# Patient Record
Sex: FEMALE | Race: WHITE | Employment: FULL TIME | URBAN - METROPOLITAN AREA
[De-identification: names, ages, dates, MRNs, and addresses within clinical notes are randomized per-mention and may not be internally consistent; named-entity substitution may affect disease eponyms.]

---

## 2017-05-26 ENCOUNTER — OFFICE VISIT (OUTPATIENT)
Dept: INTERNAL MEDICINE | Facility: CLINIC | Age: 47
End: 2017-05-26

## 2017-05-26 VITALS — HEIGHT: 69 IN | WEIGHT: 170.8 LBS | RESPIRATION RATE: 16 BRPM | BODY MASS INDEX: 25.3 KG/M2 | OXYGEN SATURATION: 98 %

## 2017-05-26 DIAGNOSIS — N20.0 NEPHROLITHIASIS: ICD-10-CM

## 2017-05-26 DIAGNOSIS — R19.5 LOOSE STOOLS: Primary | ICD-10-CM

## 2017-05-26 PROCEDURE — 99203 OFFICE O/P NEW LOW 30 MIN: CPT | Performed by: INTERNAL MEDICINE

## 2017-05-26 RX ORDER — TERBINAFINE HYDROCHLORIDE 250 MG/1
TABLET ORAL
Refills: 1 | COMMUNITY
Start: 2017-05-15

## 2017-05-26 RX ORDER — DICLOFENAC SODIUM 75 MG/1
TABLET, DELAYED RELEASE ORAL
Refills: 1 | COMMUNITY
Start: 2017-05-17 | End: 2021-07-06

## 2017-05-31 LAB
ALBUMIN SERPL-MCNC: 4.3 G/DL (ref 3.5–5.2)
ALBUMIN/GLOB SERPL: 1.9 G/DL
ALP SERPL-CCNC: 70 U/L (ref 40–129)
ALT SERPL-CCNC: 13 U/L (ref 5–33)
AST SERPL-CCNC: 20 U/L (ref 5–32)
BASOPHILS # BLD AUTO: 0.04 10*3/MM3 (ref 0–0.2)
BASOPHILS NFR BLD AUTO: 0.5 % (ref 0–2)
BILIRUB SERPL-MCNC: 0.5 MG/DL (ref 0.2–1.2)
BUN SERPL-MCNC: 11 MG/DL (ref 6–20)
BUN/CREAT SERPL: 12.5 (ref 7–25)
CALCIUM SERPL-MCNC: 9.4 MG/DL (ref 8.6–10.5)
CHLORIDE SERPL-SCNC: 101 MMOL/L (ref 98–107)
CO2 SERPL-SCNC: 26 MMOL/L (ref 22–29)
CREAT SERPL-MCNC: 0.88 MG/DL (ref 0.57–1)
EOSINOPHIL # BLD AUTO: 0.19 10*3/MM3 (ref 0.1–0.3)
EOSINOPHIL NFR BLD AUTO: 2.5 % (ref 0–4)
ERYTHROCYTE [DISTWIDTH] IN BLOOD BY AUTOMATED COUNT: 12.5 % (ref 11.5–14.5)
ERYTHROCYTE [SEDIMENTATION RATE] IN BLOOD BY WESTERGREN METHOD: 6 MM/HR (ref 0–20)
GLIADIN PEPTIDE IGA SER-ACNC: 2 UNITS (ref 0–19)
GLOBULIN SER CALC-MCNC: 2.3 GM/DL
GLUCOSE SERPL-MCNC: 84 MG/DL (ref 65–99)
H PYLORI IGA SER-ACNC: <9 UNITS (ref 0–8.9)
H PYLORI IGG SER IA-ACNC: <0.9 U/ML (ref 0–0.8)
H PYLORI IGM SER-ACNC: <9 UNITS (ref 0–8.9)
HCT VFR BLD AUTO: 42.5 % (ref 37–47)
HGB BLD-MCNC: 14.3 G/DL (ref 12–16)
IGA SERPL-MCNC: 69 MG/DL (ref 87–352)
IMM GRANULOCYTES # BLD: 0.01 10*3/MM3 (ref 0–0.03)
IMM GRANULOCYTES NFR BLD: 0.1 % (ref 0–0.5)
LYMPHOCYTES # BLD AUTO: 2.82 10*3/MM3 (ref 0.6–4.8)
LYMPHOCYTES NFR BLD AUTO: 37.4 % (ref 20–45)
MCH RBC QN AUTO: 31.4 PG (ref 27–31)
MCHC RBC AUTO-ENTMCNC: 33.6 G/DL (ref 31–37)
MCV RBC AUTO: 93.2 FL (ref 81–99)
MONOCYTES # BLD AUTO: 0.58 10*3/MM3 (ref 0–1)
MONOCYTES NFR BLD AUTO: 7.7 % (ref 3–8)
NEUTROPHILS # BLD AUTO: 3.9 10*3/MM3 (ref 1.5–8.3)
NEUTROPHILS NFR BLD AUTO: 51.8 % (ref 45–70)
NRBC BLD AUTO-RTO: 0 /100 WBC (ref 0–0)
PLATELET # BLD AUTO: 314 10*3/MM3 (ref 140–500)
POTASSIUM SERPL-SCNC: 4 MMOL/L (ref 3.5–5.2)
PROT SERPL-MCNC: 6.6 G/DL (ref 6–8.5)
RBC # BLD AUTO: 4.56 10*6/MM3 (ref 4.2–5.4)
SODIUM SERPL-SCNC: 140 MMOL/L (ref 136–145)
TTG IGA SER-ACNC: <2 U/ML (ref 0–3)
WBC # BLD AUTO: 7.54 10*3/MM3 (ref 4.8–10.8)

## 2017-06-01 ENCOUNTER — TELEPHONE (OUTPATIENT)
Dept: INTERNAL MEDICINE | Facility: CLINIC | Age: 47
End: 2017-06-01

## 2017-06-01 NOTE — TELEPHONE ENCOUNTER
----- Message from Estuardo Amador MD sent at 6/1/2017 10:31 AM EDT -----  Screening labs all very reassuraing. N ormal belly labs and no anemia.

## 2017-06-23 ENCOUNTER — OFFICE VISIT (OUTPATIENT)
Dept: INTERNAL MEDICINE | Facility: CLINIC | Age: 47
End: 2017-06-23

## 2017-06-23 VITALS
HEIGHT: 69 IN | BODY MASS INDEX: 25.33 KG/M2 | WEIGHT: 171 LBS | SYSTOLIC BLOOD PRESSURE: 122 MMHG | TEMPERATURE: 98.7 F | DIASTOLIC BLOOD PRESSURE: 68 MMHG | HEART RATE: 74 BPM | OXYGEN SATURATION: 98 %

## 2017-06-23 DIAGNOSIS — M02.332: Primary | ICD-10-CM

## 2017-06-23 PROBLEM — M02.30 REACTIVE ARTHRITIS (HCC): Status: ACTIVE | Noted: 2017-06-23

## 2017-06-23 PROCEDURE — 99214 OFFICE O/P EST MOD 30 MIN: CPT | Performed by: INTERNAL MEDICINE

## 2017-06-23 RX ORDER — DULOXETIN HYDROCHLORIDE 30 MG/1
30 CAPSULE, DELAYED RELEASE ORAL DAILY
Qty: 90 CAPSULE | Refills: 2 | Status: SHIPPED | OUTPATIENT
Start: 2017-06-23

## 2017-06-23 NOTE — PROGRESS NOTES
Subjective   Philly Martinez is a 46 y.o. female.     History of Present Illness   45 yo female with trouble since early 2013 with recurrent arthritis.  She has a confusing clinical work up.  She has seen 2 rheumatologists in past.   Had evaluation with [facility] in past, imaging there that did worry her for rheumatologic or other arthritis.     Very frustrated, waking at night with her bones hurting.       The following portions of the patient's history were reviewed and updated as appropriate: allergies, current medications, past family history, past medical history, past social history, past surgical history and problem list.    Review of Systems   Constitutional: Negative.    HENT: Negative.    Respiratory: Negative.    Cardiovascular: Negative.    Gastrointestinal:        On gluten free diet   Musculoskeletal: Positive for arthralgias and myalgias.        L wrist mostly, stiff 15min in morning   Allergic/Immunologic:        Labs reviewed from 2013, prior Diaz   All other systems reviewed and are negative.      Objective   Physical Exam   Constitutional: She appears well-developed and well-nourished.   Eyes: Conjunctivae and EOM are normal. Pupils are equal, round, and reactive to light.   Neck: Normal range of motion. No thyromegaly present.   Cardiovascular: Normal rate.    Pulmonary/Chest: Effort normal.   Abdominal: Soft.   Musculoskeletal: Normal range of motion. She exhibits no edema or deformity.   Neurological: She is alert.   Skin: Skin is warm. No erythema. No pallor.   Psychiatric: She has a normal mood and affect. Her behavior is normal.   Nursing note and vitals reviewed.  Labs from OSH reviewed and sent for scan  ASO titers ++ and mostly normal  MRI L arm negative  Hand x ray normal    Assessment/Plan   Philly was seen today for arthritis.    Diagnoses and all orders for this visit:    Porter's disease of left wrist  -     DULoxetine (CYMBALTA) 30 MG capsule; Take 1 capsule by mouth  Daily.          Concern FM - may be coincident with seronegative  Start cymbalta 30 mg po daily  wouldliek to do another ccp and uric acid but will await the order of her consults before we put her through more blood work.   No prior antibiotic trial, but will consider pending timeline with potential infectious disease consult.

## 2017-06-23 NOTE — PATIENT INSTRUCTIONS
Assessment/Plan   Philly was seen today for arthritis.    Diagnoses and all orders for this visit:    Porter's disease of left wrist  -     DULoxetine (CYMBALTA) 30 MG capsule; Take 1 capsule by mouth Daily.          Concern FM - may be coincident with seronegative  Start cymbalta 30 mg po daily  wouldliek to do another ccp and uric acid but will await the order of her consults before we put her through more blood work.   No prior antibiotic trial, but will consider pending timeline with potential infectious disease consult.

## 2020-12-11 ENCOUNTER — CONVERSION ENCOUNTER (OUTPATIENT)
Dept: ORTHOPEDIC SURGERY | Facility: CLINIC | Age: 50
End: 2020-12-11

## 2020-12-11 ENCOUNTER — OFFICE VISIT CONVERTED (OUTPATIENT)
Dept: ORTHOPEDIC SURGERY | Facility: CLINIC | Age: 50
End: 2020-12-11
Attending: PHYSICIAN ASSISTANT

## 2021-05-10 NOTE — H&P
History and Physical      Patient Name: Philly Martinez   Patient ID: [id number]   Sex: Female   YOB: 1970    Referring Provider: Modesto Linton MD    Visit Date: December 11, 2020    Provider: Modesto Linton MD   Location: [facility]   Location Address: [address] [place] [address]  [id number]   Location Phone: [phone]          Chief Complaint  · Right Knee Pain      History Of Present Illness  Philly Martinez is a 50 year old female who presents today for evaluation of her right knee and pain and swelling that have been ongoing for about a month. She denies injury, but does doing a lot of walking and had a couple of instances where she tripped and fell. She denies any pop or acute swelling right after that. She states pain is mostly in the anterior aspect, and she feels fullness in her knee. She did have an MRI that showed some abnormalities in the medial and lateral meniscus, intact cartilage, synovial plica, pes anserine bursitis.       Past Medical History  Kidney Disease         Past Surgical History  Cesarian Section; Colonoscopy         Medication List  meloxicam 15 mg oral tablet         Allergy List  NO KNOWN DRUG ALLERGIES; NO KNOWN DRUG ALLERGIES         Family Medical History  Osteoporosis         Social History  Alcohol Use (Current some day); lives with children; lives with spouse; .; Recreational Drug Use (Never); Tobacco (Former); Working         Review of Systems  · Constitutional  o Denies  o : fever, chills, weight loss  · Cardiovascular  o Denies  o : chest pain, shortness of breath  · Gastrointestinal  o Denies  o : liver disease, heartburn, nausea, blood in stools  · Genitourinary  o Denies  o : painful urination, blood in urine  · Integument  o Denies  o : rash, itching  · Neurologic  o Denies  o : headache, weakness, loss of consciousness  · Musculoskeletal  o Admits  o : painful, swollen joints  · Psychiatric  o Denies  o : drug/alcohol addiction, anxiety,  "depression      Vitals  Date Time BP Position Site L\R Cuff Size HR RR TEMP (F) WT  HT  BMI kg/m2 BSA m2 O2 Sat FR L/min FiO2 HC       12/11/2020 09:05 AM      84 - R   183lbs 4oz 5'  8\" 27.86 2 98 %            Physical Examination  · Constitutional  o Appearance  o : well developed, well-nourished, no obvious deformities present  · Head and Face  o Head  o :   § Inspection  § : normocephalic  o Face  o :   § Inspection  § : no facial lesions  · Eyes  o Conjunctivae  o : conjunctivae normal  o Sclerae  o : sclerae white  · Ears, Nose, Mouth and Throat  o Ears  o :   § External Ears  § : appearance within normal limits  § Hearing  § : intact  o Nose  o :   § External Nose  § : appearance normal  · Neck  o Inspection/Palpation  o : normal appearance  o Range of Motion  o : full range of motion  · Respiratory  o Respiratory Effort  o : breathing unlabored  o Inspection of Chest  o : normal appearance  o Auscultation of Lungs  o : no audible wheezing or rales  · Cardiovascular  o Heart  o : regular rate  · Gastrointestinal  o Abdominal Examination  o : soft and non-tender  · Skin and Subcutaneous Tissue  o General Inspection  o : intact, no rashes  · Psychiatric  o General  o : Alert and oriented x3  o Judgement and Insight  o : judgment and insight intact  o Mood and Affect  o : mood normal, affect appropriate  · Right Knee  o Inspection  o : Trace to 1+ effusion. No discoloration or deformity. No joint line tenderness. Full extension. Flexion 120. Calf supple, nontender; no signs of DVT. Stable to varus/valgus stress. Negative anterior drawer. Negative Lachman. Negative Nicolasa's. Negative Apley's. Negative posterior sag. Mild limping gait.   · Injection Note/Aspiration Note  o Site  o : right knee  o Procedure  o : After educating the patient, patient gave consent for procedure. After using Chloraprep, the joint space was injected. The patient tolerated the procedure well.  o Medication  o : 80 mg of DepoMedrol with " 9cc of 1% Lidocaine  · Imaging  o Imaging  o : MRI performed at [facility] 12/04/2020 revealed: 1) Sprain or inflammatory changes in the superficial fibers of the medial collateral ligament with no evidence of disruption; 2) Signal abnormality in the body of the medial meniscus which appears to just reach the articular surface, suggesting a subtle meniscal tear. Intrasubstance degenerative signal can on occasion also cause this appearance, however; 3) Fraying along the free edge of the body of lateral meniscus; 4) Moderate-sized joint effusion and small medial patellar plica; 5) Medial and lateral subcutaneous edema, as well as mild edema adjacent to the tendons of the pes ancerinus, suggesting mild inflammatory changes.           Assessment  · Right lateral meniscus tear     836.1/S83.289A  · Right medial meniscus tear     836.0/S83.249A  · Right knee pain, unspecified chronicity     719.46/M25.561  · Right knee MCL sprain     844.9/S83.90XA  · Right knee effusion     719.06/M25.461      Plan  · Orders  o Depo-Medrol injection 80mg () - - 12/11/2020   Lot [id number] Exp 10 2021 [employer] Administered by AURELIA Linton MD  o Knee Intra-articular Injection without US Guidance [facility] ([id number]) - - 12/11/2020   Lot [id number] Exp 08 01 2021 [employer] Administered by AURELIA Linton MD  · Medications  o Medications have been Reconciled  o Transition of Care or Provider Policy  · Instructions  o Reviewed the patient's Past Medical, Social, and Family history as well as the ROS at today's visit, no changes.  o Call or return if worsening symptoms.  o Exercise handout given.  o Right knee injection today.   o Follow up in 4 weeks to re-evaluate.   o This note was transcribed by Coco moyer/zhen.            Electronically Signed by: Coco Hoover-, Other -Author on December 13, 2020 10:28:29 AM  Electronically Co-signed by: KENRICK Jonas-ISSAC -Reviewer on December 14, 2020 01:53:32 PM

## 2021-05-14 VITALS — WEIGHT: 183.25 LBS | OXYGEN SATURATION: 98 % | HEART RATE: 84 BPM | BODY MASS INDEX: 27.77 KG/M2 | HEIGHT: 68 IN

## 2021-07-06 ENCOUNTER — OFFICE VISIT (OUTPATIENT)
Dept: ORTHOPEDIC SURGERY | Facility: CLINIC | Age: 51
End: 2021-07-06

## 2021-07-06 VITALS — BODY MASS INDEX: 26.37 KG/M2 | HEIGHT: 68 IN | HEART RATE: 70 BPM | WEIGHT: 174 LBS | OXYGEN SATURATION: 99 %

## 2021-07-06 DIAGNOSIS — G89.29 CHRONIC PAIN OF RIGHT KNEE: Primary | ICD-10-CM

## 2021-07-06 DIAGNOSIS — M25.561 CHRONIC PAIN OF RIGHT KNEE: Primary | ICD-10-CM

## 2021-07-06 PROCEDURE — 20610 DRAIN/INJ JOINT/BURSA W/O US: CPT | Performed by: PHYSICIAN ASSISTANT

## 2021-07-06 PROCEDURE — 99213 OFFICE O/P EST LOW 20 MIN: CPT | Performed by: PHYSICIAN ASSISTANT

## 2021-07-06 RX ORDER — MELOXICAM 15 MG/1
15 TABLET ORAL DAILY
Qty: 30 TABLET | Refills: 1 | Status: SHIPPED | OUTPATIENT
Start: 2021-07-06

## 2021-07-06 RX ORDER — METHYLPREDNISOLONE ACETATE 80 MG/ML
80 INJECTION, SUSPENSION INTRA-ARTICULAR; INTRALESIONAL; INTRAMUSCULAR; SOFT TISSUE
Status: COMPLETED | OUTPATIENT
Start: 2021-07-06 | End: 2021-07-06

## 2021-07-06 RX ORDER — LIDOCAINE HYDROCHLORIDE 10 MG/ML
9 INJECTION, SOLUTION INFILTRATION; PERINEURAL
Status: COMPLETED | OUTPATIENT
Start: 2021-07-06 | End: 2021-07-06

## 2021-07-06 RX ADMIN — LIDOCAINE HYDROCHLORIDE 9 ML: 10 INJECTION, SOLUTION INFILTRATION; PERINEURAL at 12:37

## 2021-07-06 RX ADMIN — METHYLPREDNISOLONE ACETATE 80 MG: 80 INJECTION, SUSPENSION INTRA-ARTICULAR; INTRALESIONAL; INTRAMUSCULAR; SOFT TISSUE at 12:37

## 2021-07-06 NOTE — ASSESSMENT & PLAN NOTE
Patient elected to receive right knee steroid injection, tolerated well, risk and benefits discussed, recommend resting icing and elevating over the next few days.  Patient will start taking Mobic daily.  I recommend she follow-up with rheumatologist again and also recommend she see her primary doctor for further evaluation of her multijoint arthritis/swelling.  She will follow up in 6 weeks for recheck.

## 2021-07-06 NOTE — PATIENT INSTRUCTIONS
Rest ice and elevate over the next 1 to 2 days, begin taking Mobic, follow-up in 6 weeks for recheck.

## 2021-07-06 NOTE — PROGRESS NOTES
"Chief Complaint  Follow-up and Pain of the Right Knee    Subjective          Philly Martinez presents to [facility] for follow-up on right knee pain.  She has had knee pain and swelling for 7 to 8 months now.  She denies any known injury in the knee.  Patient states she was doing well since her last visit on December 11, 2020 when she received a right knee steroid injection.  She states around 7 June she began walking 4 miles a day to get back in shape and about 3 days into this she noted pain and swelling in her knee that got worse.  She does not take anything daily for pain, just ibuprofen occasionally.  She also states that she has seen a rheumatologist here in [facility] because she has had left wrist, bilateral ankle and right knee swelling off and on for some time now.  She states she was told that her CRP was very high but she was told by the rheumatologist she did not think that she had rheumatoid arthritis, the patient is unsure if she was checked for any other rheumatologic diseases.  She states she does not have any known family history of these.    Objective   Vital Signs:   Pulse 70   Ht 171.5 cm (67.5\")   Wt 78.9 kg (174 lb)   SpO2 99%   BMI 26.85 kg/m²       Physical Exam  Constitutional:       Appearance: Normal appearance. He is well-developed and normal weight.   HENT:      Head: Normocephalic.      Right Ear: Hearing and external ear normal.      Left Ear: Hearing and external ear normal.      Nose: Nose normal.   Eyes:      Conjunctiva/sclera: Conjunctivae normal.   Cardiovascular:      Rate and Rhythm: Normal rate.   Pulmonary:      Effort: Pulmonary effort is normal.      Breath sounds: No wheezing or rales.   Abdominal:      Palpations: Abdomen is soft.      Tenderness: There is no abdominal tenderness.   Musculoskeletal:      Cervical back: Normal range of motion.   Skin:     Findings: No rash.   Neurological:      Mental Status: He is alert and oriented to person, " place, and time.   Psychiatric:         Mood and Affect: Mood and affect normal.         Judgment: Judgment normal.     Ortho Exam  Right knee: Moderate swelling, skin intact, neurovascularly intact, dorsalis pedis pulses 2+, crepitus with range of motion, full flexion and extension, gait nonantalgic, full range of motion of the right ankle and digits.  Left wrist: Moderate swelling, full range of motion, no tenderness to palpation.  Result Review :            Imaging Results (Most Recent)     None         Large Joint Arthrocentesis: R knee  Date/Time: 7/6/2021 12:37 PM  Consent given by: patient  Site marked: site marked  Timeout: Immediately prior to procedure a time out was called to verify the correct patient, procedure, equipment, support staff and site/side marked as required   Supporting Documentation  Indications: pain   Procedure Details  Location: knee - R knee  Needle gauge: 21g.  Medications administered: 9 mL lidocaine 1 %; 80 mg methylPREDNISolone acetate 80 MG/ML  Patient tolerance: patient tolerated the procedure well with no immediate complications            Assessment and Plan    Problem List Items Addressed This Visit        Musculoskeletal and Injuries    Chronic pain of right knee - Primary    Current Assessment & Plan     Patient elected to receive right knee steroid injection, tolerated well, risk and benefits discussed, recommend resting icing and elevating over the next few days.  Patient will start taking Mobic daily.  I recommend she follow-up with rheumatologist again and also recommend she see her primary doctor for further evaluation of her multijoint arthritis/swelling.  She will follow up in 6 weeks for recheck.         Relevant Medications    meloxicam (MOBIC) 15 MG tablet    Other Relevant Orders    Large Joint Arthrocentesis: R knee          Follow Up   Return in about 6 weeks (around 8/17/2021) for Recheck.  Patient Instructions   Rest ice and elevate over the next 1 to 2 days,  begin taking Mobic, follow-up in 6 weeks for recheck.    Patient was given instructions and counseling regarding her condition or for health maintenance advice. Please see specific information pulled into the AVS if appropriate.

## 2021-08-17 ENCOUNTER — OFFICE VISIT (OUTPATIENT)
Dept: ORTHOPEDIC SURGERY | Facility: CLINIC | Age: 51
End: 2021-08-17

## 2021-08-17 VITALS — HEART RATE: 96 BPM | BODY MASS INDEX: 27.34 KG/M2 | WEIGHT: 184.6 LBS | HEIGHT: 69 IN | OXYGEN SATURATION: 99 %

## 2021-08-17 DIAGNOSIS — G89.29 CHRONIC PAIN OF RIGHT KNEE: Primary | ICD-10-CM

## 2021-08-17 DIAGNOSIS — M25.561 CHRONIC PAIN OF RIGHT KNEE: Primary | ICD-10-CM

## 2021-08-17 PROCEDURE — 99213 OFFICE O/P EST LOW 20 MIN: CPT | Performed by: PHYSICIAN ASSISTANT

## 2021-08-17 NOTE — PROGRESS NOTES
Chief Complaint  No chief complaint on file.    Subjective          Philly Martinez presents to [facility] for follow-up on right knee pain.  At her last visit she received right knee steroid injection, she states this provided her with a great amount of relief, especially the day after the injection.  She also feels the Mobic is helping with her pain.  She still has pain if she tries to kneel on her knee.  She is happy with the progress she has made so far.    Objective   Vital Signs:   There were no vitals taken for this visit.      Physical Exam  Constitutional:       Appearance: Normal appearance. He is well-developed and normal weight.   HENT:      Head: Normocephalic.      Right Ear: Hearing and external ear normal.      Left Ear: Hearing and external ear normal.      Nose: Nose normal.   Eyes:      Conjunctiva/sclera: Conjunctivae normal.   Cardiovascular:      Rate and Rhythm: Normal rate.   Pulmonary:      Effort: Pulmonary effort is normal.      Breath sounds: No wheezing or rales.   Abdominal:      Palpations: Abdomen is soft.      Tenderness: There is no abdominal tenderness.   Musculoskeletal:      Cervical back: Normal range of motion.   Skin:     Findings: No rash.   Neurological:      Mental Status: He is alert and oriented to person, place, and time.   Psychiatric:         Mood and Affect: Mood and affect normal.         Judgment: Judgment normal.     Ortho Exam  Right knee: Tenderness to the joint line, mild swelling, full flexion extension, crepitus, stable to varus valgus stress, gait nonantalgic, sensation light touch intact, posterior tibialis pulses 2+ bilaterally, good range of motion right ankle and digits.  Result Review :            Imaging Results (Most Recent)     None                Assessment and Plan    Problem List Items Addressed This Visit        Musculoskeletal and Injuries    Chronic pain of right knee - Primary    Current Assessment & Plan     Recommend  continuation of Mobic, home exercises were printed, patient will gradually return to walking to see if she is able to tolerate this.  She plans to follow-up on an as-needed basis in the future.               Follow Up   Return if symptoms worsen or fail to improve.  Patient Instructions   Recommend continuation of Mobic, home exercises were printed, patient will gradually return to walking to see if she is able to tolerate this.  She plans to follow-up on an as-needed basis in the future.    Patient was given instructions and counseling regarding her condition or for health maintenance advice. Please see specific information pulled into the AVS if appropriate.

## 2021-08-17 NOTE — PATIENT INSTRUCTIONS
Recommend continuation of Mobic, home exercises were printed, patient will gradually return to walking to see if she is able to tolerate this.  She plans to follow-up on an as-needed basis in the future.

## 2023-03-20 ENCOUNTER — OFFICE VISIT (OUTPATIENT)
Dept: ORTHOPEDIC SURGERY | Facility: CLINIC | Age: 53
End: 2023-03-20
Payer: COMMERCIAL

## 2023-03-20 VITALS — OXYGEN SATURATION: 100 % | BODY MASS INDEX: 26.36 KG/M2 | HEART RATE: 90 BPM | HEIGHT: 69 IN | WEIGHT: 178 LBS

## 2023-03-20 DIAGNOSIS — M25.561 CHRONIC PAIN OF RIGHT KNEE: Primary | ICD-10-CM

## 2023-03-20 DIAGNOSIS — M17.11 PRIMARY OSTEOARTHRITIS OF RIGHT KNEE: ICD-10-CM

## 2023-03-20 DIAGNOSIS — G89.29 CHRONIC PAIN OF RIGHT KNEE: Primary | ICD-10-CM

## 2023-03-20 PROCEDURE — 20610 DRAIN/INJ JOINT/BURSA W/O US: CPT | Performed by: STUDENT IN AN ORGANIZED HEALTH CARE EDUCATION/TRAINING PROGRAM

## 2023-03-20 RX ORDER — METHYLPREDNISOLONE ACETATE 80 MG/ML
80 INJECTION, SUSPENSION INTRA-ARTICULAR; INTRALESIONAL; INTRAMUSCULAR; SOFT TISSUE
Status: COMPLETED | OUTPATIENT
Start: 2023-03-20 | End: 2023-03-20

## 2023-03-20 RX ORDER — LIDOCAINE HYDROCHLORIDE 10 MG/ML
5 INJECTION, SOLUTION EPIDURAL; INFILTRATION; INTRACAUDAL; PERINEURAL
Status: COMPLETED | OUTPATIENT
Start: 2023-03-20 | End: 2023-03-20

## 2023-03-20 RX ADMIN — LIDOCAINE HYDROCHLORIDE 5 ML: 10 INJECTION, SOLUTION EPIDURAL; INFILTRATION; INTRACAUDAL; PERINEURAL at 15:27

## 2023-03-20 RX ADMIN — METHYLPREDNISOLONE ACETATE 80 MG: 80 INJECTION, SUSPENSION INTRA-ARTICULAR; INTRALESIONAL; INTRAMUSCULAR; SOFT TISSUE at 15:27

## 2023-03-20 NOTE — PROGRESS NOTES
"Chief Complaint  Pain and Initial Evaluation of the Right Knee    Subjective          Philly Martinez presents to [facility] for   History of Present Illness    The patient presents here today for evaluation of the right knee. The patient reports right knee pain for several years. She has previously gotten injections with relief. Her last injection was July 2021 by Gladys Resendiz. She denies any new injury or trauma. She has no other complaints.     No Known Allergies     Social History     Socioeconomic History   • Marital status:    Tobacco Use   • Smoking status: Former   • Smokeless tobacco: Never   Substance and Sexual Activity   • Alcohol use: Not Currently     Comment: social   • Drug use: Never   • Sexual activity: Yes     Partners: Male     Birth control/protection: None, Vasectomy        I reviewed the patient's chief complaint, history of present illness, review of systems, past medical history, surgical history, family history, social history, medications, and allergy list.     REVIEW OF SYSTEMS    Constitutional: Denies fevers, chills, weight loss  Cardiovascular: Denies chest pain, shortness of breath  Skin: Denies rashes, acute skin changes  Neurologic: Denies headache, loss of consciousness  MSK: Right knee pain      Objective   Vital Signs:   Pulse 90   Ht 175.3 cm (69\")   Wt 80.7 kg (178 lb)   SpO2 100%   BMI 26.29 kg/m²     Body mass index is 26.29 kg/m².    Physical Exam    General: Alert. No acute distress.   Right knee- ROM 0-120 degrees. Positive crepitus. Stable to varus/valgus stress. Stable to anterior/posterior drawer. Negative Lachman's. Positive EHL, FHL, GS and TA. Sensation intact to all 5 nerves of the foot. Positive pulses. No effusion. Negative Nicolasa's. No joint line pain.     Large Joint Arthrocentesis: R knee  Date/Time: 3/20/2023 3:27 PM  Consent given by: patient  Site marked: site marked  Timeout: Immediately prior to procedure a time out " was called to verify the correct patient, procedure, equipment, support staff and site/side marked as required   Supporting Documentation  Indications: pain   Procedure Details  Location: knee - R knee  Needle gauge: 21 G.  Medications administered: 5 mL lidocaine PF 1% 1 %; 80 mg methylPREDNISolone acetate 80 MG/ML  Patient tolerance: patient tolerated the procedure well with no immediate complications          Imaging Results (Most Recent)     Procedure Component Value Units Date/Time    XR Knee 4+ View Right [[id number]] Resulted: 03/20/23 1536     Updated: 03/20/23 1537    Narrative:      Indications: Right knee pain    Views: Weightbearing AP, PA flexion, lateral, sunrise right knee    Findings: No fractures noted.  Mild patellofemoral arthritic change with   early peripheral osteophyte formation noted.    Comparative Data: No comparative data available                     Assessment and Plan        XR Knee 4+ View Right    Result Date: 3/20/2023  Narrative: Indications: Right knee pain Views: Weightbearing AP, PA flexion, lateral, sunrise right knee Findings: No fractures noted.  Mild patellofemoral arthritic change with early peripheral osteophyte formation noted. Comparative Data: No comparative data available        Diagnoses and all orders for this visit:    1. Chronic pain of right knee (Primary)  -     XR Knee 4+ View Right    2. Primary osteoarthritis of right knee    Other orders  -     Large Joint Arthrocentesis: R knee        Discussed the treatment plan with the patient.  I reviewed the x-rays that were obtained today with the patient. Discussed the risks and benefits of a right knee steroid injection. The patient expressed understanding and wished to proceed. She tolerated the injection well.       Call or return if worsening symptoms.    Scribed for Timothy Huff MD by Hoda Hemphill  03/20/2023   15:16 EDT         Follow Up       PRN    Patient was given instructions and counseling  regarding her condition or for health maintenance advice. Please see specific information pulled into the AVS if appropriate.       I have personally performed the services described in this document as scribed by the above individual and it is both accurate and complete.     Timothy Huff MD  03/20/23  16:42 EDT

## 2024-09-12 ENCOUNTER — OFFICE VISIT (OUTPATIENT)
Dept: OTOLARYNGOLOGY | Facility: CLINIC | Age: 54
End: 2024-09-12
Payer: COMMERCIAL

## 2024-09-12 VITALS — BODY MASS INDEX: 26.51 KG/M2 | HEIGHT: 69 IN | TEMPERATURE: 97.8 F | WEIGHT: 179 LBS

## 2024-09-12 DIAGNOSIS — R06.83 SNORING: ICD-10-CM

## 2024-09-12 DIAGNOSIS — H61.21 IMPACTED CERUMEN OF RIGHT EAR: Primary | ICD-10-CM

## 2024-09-12 DIAGNOSIS — J34.3 HYPERTROPHY OF BOTH INFERIOR NASAL TURBINATES: ICD-10-CM

## 2024-09-12 RX ORDER — CETIRIZINE HYDROCHLORIDE 10 MG/1
TABLET ORAL EVERY 24 HOURS
COMMUNITY

## 2024-09-12 RX ORDER — FLUTICASONE PROPIONATE 50 MCG
SPRAY, SUSPENSION (ML) NASAL
COMMUNITY

## 2024-09-12 RX ORDER — LEVOCETIRIZINE DIHYDROCHLORIDE 5 MG/1
TABLET, FILM COATED ORAL
COMMUNITY
Start: 2024-05-28

## 2024-09-12 NOTE — PROGRESS NOTES
Patient Name: Philly Martinez   Visit Date: 2024   Patient ID: [id number]  Provider: Derek Reardon MD    Sex: female  Location: [facility]   YOB: 1970  Location Address: [address] [place] [address], [address] [place] [address],?[id number]    Primary Care Provider Sabi Vasquez APRN  Location Phone: [phone]    Referring Provider: ERYN Pauslon        Chief Complaint  Nasal / Sinus Polyps noted on CT (New Patient ), Hearing Loss, and Cerumen Impaction    History of Present Illness  Philly Martinez is a 53 y.o. female who presents to [facility] today as a consult from ERYN Paulson for evaluation of her sinuses.  She tells me that in May she acutely developed a severe headache. CT scan of the head without contrast on 2024 at [facility] revealed a left maxillary sinus mucous retention cyst but was otherwise unremarkable.  She does report occasional nasal congestion but denies any rhinorrhea, epistaxis, or hyposmia.  She does experience intermittent sneezing.  She is currently using fluticasone.  She has undergone allergy testing in the past.  She also mentions nightly snoring.  She has undergone 2 separate polysomnograms and tells me that both of these were negative for sleep apnea.  She does snore severely and has tried nasal strips and a chinstrap without improvement.    Past Medical History:   Diagnosis Date    Kidney disease     Loose stools 2017    Low back strain     Nephrolithiasis 2017    Reactive arthritis 2017    Scoliosis     Tear of meniscus of knee     Wrist sprain        Past Surgical History:   Procedure Laterality Date     SECTION      COLONOSCOPY      HAND SURGERY           Current Outpatient Medications:     carbamide peroxide (Debrox) 6.5 % otic solution, Administer 5 drops to the right ear 2 (Two) Times a Day for 14 days., Disp: 7 mL, Rfl: 0    cetirizine  "(ZyrTEC Allergy) 10 MG tablet, Daily. (Patient not taking: Reported on 9/12/2024), Disp: , Rfl:     fluticasone (FLONASE) 50 MCG/ACT nasal spray, instill 1 spray IN EACH NOSTRIL once per day (Patient not taking: Reported on 9/12/2024), Disp: , Rfl:     levocetirizine (XYZAL) 5 MG tablet, TAKE 1 TABLET BY MOUTH once per day for allergies (Patient not taking: Reported on 9/12/2024), Disp: , Rfl:      No Known Allergies    Social History     Tobacco Use    Smoking status: Former     Passive exposure: Past    Smokeless tobacco: Never   Vaping Use    Vaping status: Never Used   Substance Use Topics    Alcohol use: Not Currently     Comment: social    Drug use: Never        Objective     Vital Signs:   Temp 97.8 °F (36.6 °C) (Tympanic)   Ht 175.3 cm (69\")   Wt 81.2 kg (179 lb)   BMI 26.43 kg/m²       Physical Exam    General: Well developed, well nourished patient of stated age in no acute distress. Voice is strong and clear.   Head: Normocephalic and atraumatic.  Face: No lesions.  Bilateral parotid and submandibular glands are unremarkable.  Stensen's and Warthin's ducts are productive of clear saliva bilaterally.  House-Brackmann I/VI     bilaterally.   muscles and temporomandibular joint nontender to palpation.  No TMJ crepitus.  Eyes: PERRLA, sclerae anicteric, no conjunctival injection. Extraocular movements are intact and full. No nystagmus.   Ears: Auricles are normal in appearance. Bilateral external auditory canals are stenotic laterally and angled superiorly.  The right is completely impacted with cerumen.  Multiple attempts were made at removing the cerumen using the working otoscope and curette.  Left tympanic membrane is clear and without effusion. Hearing normal to conversational voice.   Nose: External nose is normal in appearance. Bilateral nares are patent with normal appearing mucosa. Septum relatively midline.  Bilateral inferior turbinates are hypertrophied. No lesions.   Oral Cavity: " Lips are normal in appearance. Oral mucosa is unremarkable. Gingiva is unremarkable.  Worn l dentition for age. Tongue is unremarkable with good movement. Hard palate is unremarkable.   Oropharynx: Soft palate is unremarkable with full movement. Uvula is unremarkable. Bilateral tonsils are unremarkable. Posterior oropharynx is unremarkable.    Larynx and hypopharynx: Deferred secondary to gag reflex.  Neck: Supple.  No mass.  Nontender to palpation.  Trachea midline. Thyroid normal size and without nodules to palpation.   Lymphatic: No lymphadenopathy upon palpation.  Respiratory: Clear to auscultation bilaterally, nonlabored respirations    Cardiovascular: RRR, no murmurs, rubs, or gallops,   Psychiatric: Appropriate affect, cooperative   Neurologic: Oriented x 3, strength symmetric in all extremities, Cranial Nerves II-XII are grossly intact to confrontation   Skin: Warm and dry. No rashes.    Procedures           Result Review :               Assessment and Plan    Diagnoses and all orders for this visit:    1. Impacted cerumen of right ear (Primary)  -     carbamide peroxide (Debrox) 6.5 % otic solution; Administer 5 drops to the right ear 2 (Two) Times a Day for 14 days.  Dispense: 7 mL; Refill: 0    2. Snoring    3. Hypertrophy of both inferior nasal turbinates    Impressions and findings were discussed at great length.  Currently, she is seen for evaluation of an incidentally noted left maxillary sinus mucous retention cyst.  We reviewed and discussed the results of her 5/14/2024 CT scan of the head without contrast from [facility].  She does experience occasional nasal congestion but her main complaint today is snoring.  Examination today is concerning for bilateral inferior turbinate hypertrophy and she was noted to have a complete right-sided cerumen impaction which was unable to be removed using the working otoscope.  We discussed the pathophysiology and natural history of her  conditions.  Options for management were discussed and she will be placed on Debrox for the right-sided cerumen impaction and follow-up in our [facility] for binocular microscopy.  In regards to her snoring we discussed that I would try using Afrin nasal spray before bed over the next 3-4 nights to see if this may be helpful.  If this is helpful she may consider further medical management versus surgical management of her inferior turbinates.  We also discussed other procedures and oral appliances for snoring.  She was given ample time to ask questions, all of which were answered to her satisfaction.        Follow Up   Return in about 2 weeks (around 9/26/2024).  Patient was given instructions and counseling regarding her condition or for health maintenance advice. Please see specific information pulled into the AVS if appropriate.